# Patient Record
Sex: FEMALE
[De-identification: names, ages, dates, MRNs, and addresses within clinical notes are randomized per-mention and may not be internally consistent; named-entity substitution may affect disease eponyms.]

---

## 2020-06-30 ENCOUNTER — NURSE TRIAGE (OUTPATIENT)
Dept: OTHER | Facility: CLINIC | Age: 25
End: 2020-06-30

## 2020-11-19 ENCOUNTER — TELEPHONE (OUTPATIENT)
Dept: NEUROLOGY | Age: 25
End: 2020-11-19
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
## 2020-12-01 ENCOUNTER — NURSE TRIAGE (OUTPATIENT)
Dept: OTHER | Facility: CLINIC | Age: 25
End: 2020-12-01

## 2020-12-02 NOTE — TELEPHONE ENCOUNTER
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  Wears [address] tested positive and around [address]. Ate lunch with COVID co-worker. Last contact - Thursday last week. No masks during lunch. SOB, Wheezing, decreased oxygen saturation this morning (88%), severe HA, slight cough. States her SPO2 has been oscar today. Chest tightness. Comes and goes. Denies any lung or chest history. FLU shot - yes. 11/6/2020    Works at [employer].    Recommendation is to proceed to the ED for evaluation.       Reason for Disposition   [1] Symptoms of COVID-19 (e.g., cough, fever, SOB, or others) AND [2] within 14 days of EXPOSURE (close contact) with diagnosed or suspected COVID-19 patient   Chest pain or pressure    Protocols used: CORONAVIRUS (COVID-19) EXPOSURE-ADULT-AH, CORONAVIRUS (COVID-19) DIAGNOSED OR SUSPECTED-ADULT-AH